# Patient Record
Sex: FEMALE | Race: WHITE | Employment: FULL TIME | URBAN - METROPOLITAN AREA
[De-identification: names, ages, dates, MRNs, and addresses within clinical notes are randomized per-mention and may not be internally consistent; named-entity substitution may affect disease eponyms.]

---

## 2018-06-07 ENCOUNTER — APPOINTMENT (OUTPATIENT)
Dept: CT IMAGING | Age: 48
End: 2018-06-07
Attending: EMERGENCY MEDICINE
Payer: COMMERCIAL

## 2018-06-07 ENCOUNTER — HOSPITAL ENCOUNTER (EMERGENCY)
Age: 48
Discharge: HOME | End: 2018-06-08
Payer: COMMERCIAL

## 2018-06-07 DIAGNOSIS — R60.9 DEPENDENT EDEMA: Primary | ICD-10-CM

## 2018-06-07 LAB — D DIMER PPP FEU-MCNC: 0.56 UG/ML(FEU)

## 2018-06-07 PROCEDURE — 85379 FIBRIN DEGRADATION QUANT: CPT

## 2018-06-07 PROCEDURE — 99283 EMERGENCY DEPT VISIT LOW MDM: CPT

## 2018-06-07 RX ORDER — SODIUM CHLORIDE 0.9 % (FLUSH) 0.9 %
10 SYRINGE (ML) INJECTION
Status: DISCONTINUED | OUTPATIENT
Start: 2018-06-07 | End: 2018-06-08

## 2018-06-07 RX ORDER — MELOXICAM 15 MG/1
15 TABLET ORAL DAILY
COMMUNITY

## 2018-06-08 ENCOUNTER — APPOINTMENT (OUTPATIENT)
Dept: ULTRASOUND IMAGING | Age: 48
End: 2018-06-08
Payer: COMMERCIAL

## 2018-06-08 VITALS
WEIGHT: 160 LBS | RESPIRATION RATE: 20 BRPM | SYSTOLIC BLOOD PRESSURE: 162 MMHG | BODY MASS INDEX: 27.31 KG/M2 | DIASTOLIC BLOOD PRESSURE: 90 MMHG | OXYGEN SATURATION: 100 % | HEIGHT: 64 IN | TEMPERATURE: 98.4 F | HEART RATE: 80 BPM

## 2018-06-08 PROCEDURE — 93970 EXTREMITY STUDY: CPT

## 2018-06-08 NOTE — ED NOTES
I have reviewed discharge instructions with the patient. The patient verbalized understanding. Patient left ED via Discharge Method: ambulatory to Home with friend. Opportunity for questions and clarification provided. Patient given 0 scripts. To continue your aftercare when you leave the hospital, you may receive an automated call from our care team to check in on how you are doing. This is a free service and part of our promise to provide the best care and service to meet your aftercare needs.  If you have questions, or wish to unsubscribe from this service please call [phone]. Thank you for Choosing our [facility] Emergency Department.

## 2018-06-08 NOTE — ED PROVIDER NOTES
HPI Comments: 49-year-old female complaining of lower extremity swelling. 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 There is no redness or pain. Patient has no shortness of breath or tachycardia. Patient was talking with a friend who is a nurse who wanted her checked for blood clots. Patient with [facility] and they sent her to the ED. Patient is a 52 y.o. female presenting with lower extremity edema. The history is provided by the patient. Leg Swelling    This is a new problem. The current episode started more than 2 days ago. The problem occurs constantly. The problem has not changed since onset. The pain is present in the left ankle and right ankle. Pertinent negatives include no numbness, full range of motion, no stiffness and no tingling. She has tried nothing for the symptoms. There has been no history of extremity trauma. No past medical history on file. Past Surgical History:   Procedure Laterality Date    HX HYSTERECTOMY      HX ORTHOPAEDIC      back and neck         No family history on file. Social History     Social History    Marital status: SINGLE     Spouse name: N/A    Number of children: N/A    Years of education: N/A     Occupational History    Not on file. Social History Main Topics    Smoking status: Not on file    Smokeless tobacco: Not on file    Alcohol use Not on file    Drug use: Not on file    Sexual activity: Not on file     Other Topics Concern    Not on file     Social History Narrative    No narrative on file         ALLERGIES: Doxylamine    Review of Systems   Constitutional: Negative. Negative for activity change. HENT: Negative. Eyes: Negative. Respiratory: Negative. Cardiovascular: Negative. Gastrointestinal: Negative. Genitourinary: Negative. Musculoskeletal: Negative. Negative for stiffness. Skin: Negative. Neurological: Negative.   Negative for tingling and numbness. Psychiatric/Behavioral: Negative. All other systems reviewed and are negative. Vitals:    06/07/18 2253   BP: 155/86   Pulse: 71   Resp: 20   Temp: 98.4 °F (36.9 °C)   SpO2: 100%   Weight: 72.6 kg (160 lb)   Height: 5' 4\" (1.626 m)            Physical Exam   Constitutional: She is oriented to person, place, and time. She appears well-developed and well-nourished. No distress. HENT:   Head: Normocephalic and atraumatic. Right Ear: External ear normal.   Left Ear: External ear normal.   Nose: Nose normal.   Mouth/Throat: Oropharynx is clear and moist. No oropharyngeal exudate. Eyes: Conjunctivae and EOM are normal. Pupils are equal, round, and reactive to light. Right eye exhibits no discharge. Left eye exhibits no discharge. No scleral icterus. Neck: Normal range of motion. Neck supple. No JVD present. No tracheal deviation present. Cardiovascular: Normal rate, regular rhythm and intact distal pulses. Pulmonary/Chest: Effort normal and breath sounds normal. No stridor. No respiratory distress. She has no wheezes. She exhibits no tenderness. Abdominal: Soft. Bowel sounds are normal. She exhibits no distension and no mass. There is no tenderness. Musculoskeletal: Normal range of motion. She exhibits no edema or tenderness. Neurological: She is alert and oriented to person, place, and time. No cranial nerve deficit. Skin: Skin is warm and dry. No rash noted. She is not diaphoretic. No erythema. No pallor. Psychiatric: She has a normal mood and affect. Her behavior is normal. Thought content normal.   Nursing note and vitals reviewed. MDM  Number of Diagnoses or Management Options  Diagnosis management comments: CT chest was ordered through triage however patient does not complain of shortness of breath has been tachypnea or tachycardia Cardia or hypoxia. We will wait until lab tests come back before further imaging.        Amount and/or Complexity of Data Reviewed  Clinical lab tests: ordered and reviewed  Tests in the radiology section of CPT®: ordered and reviewed  Tests in the medicine section of CPT®: ordered and reviewed          ED Course       Procedures

## 2018-06-08 NOTE — DISCHARGE INSTRUCTIONS
Leg and Ankle Edema: Care Instructions  Your Care Instructions  Swelling in the legs, ankles, and feet is called edema. It is common after you sit or stand for a while. Long plane flights or car rides often cause swelling in the legs and feet. You may also have swelling if you have to stand for long periods of time at your job. Problems with the veins in the legs (varicose veins) and changes in hormones can also cause swelling. Sometimes the swelling in the ankles and feet is caused by a more serious problem, such as heart failure, infection, blood clots, or liver or kidney disease. Follow-up care is a key part of your treatment and safety. Be sure to make and go to all appointments, and call your doctor if you are having problems. It's also a good idea to know your test results and keep a list of the medicines you take. How can you care for yourself at home? · If your doctor gave you medicine, take it as prescribed. Call your doctor if you think you are having a problem with your medicine. · Whenever you are resting, raise your legs up. Try to keep the swollen area higher than the level of your heart. · Take breaks from standing or sitting in one position. ¨ Walk around to increase the blood flow in your lower legs. ¨ Move your feet and ankles often while you stand, or tighten and relax your leg muscles. · Wear support stockings. Put them on in the morning, before swelling gets worse. · Eat a balanced diet. Lose weight if you need to. · Limit the amount of salt (sodium) in your diet. Salt holds fluid in the body and may increase swelling. When should you call for help? Call 911 anytime you think you may need emergency care. For example, call if:  ? · You have symptoms of a blood clot in your lung (called a pulmonary embolism). These may include:  ¨ Sudden chest pain. ¨ Trouble breathing. ¨ Coughing up blood.    ?Call your doctor now or seek immediate medical care if:  ? · You have signs of a blood clot, such as:  ¨ Pain in your calf, back of the knee, thigh, or groin. ¨ Redness and swelling in your leg or groin. ? · You have symptoms of infection, such as:  ¨ Increased pain, swelling, warmth, or redness. ¨ Red streaks or pus. ¨ A fever. ? Watch closely for changes in your health, and be sure to contact your doctor if:  ? · Your swelling is getting worse. ? · You have new or worsening pain in your legs. ? · You do not get better as expected. Where can you learn more? Go to http://jaden-daksha.info/. Enter I250 in the search box to learn more about \"Leg and Ankle Edema: Care Instructions. \"  Current as of: March 20, 2017  Content Version: 11.4  © 7907-3818 [employer]. Care instructions adapted under license by [employer] (which disclaims liability or warranty for this information). If you have questions about a medical condition or this instruction, always ask your healthcare professional. [place] [address] any warranty or liability for your use of this information.

## 2018-06-08 NOTE — ED NOTES
Pt sent here from [facility] to rule out a blood clot. Pt states \"I don't think there's anything wrong with me but my friends want me to get checked out. \" Reports [place] is [place] and has lower leg swelling.

## 2018-06-08 NOTE — ED TRIAGE NOTES
Pt sent from [facility] to rule out blood clot  Pt had lab work done at [facility] Creatinine, POC 1.10 (H) mg/dL